# Patient Record
Sex: FEMALE | Race: WHITE | NOT HISPANIC OR LATINO | Employment: FULL TIME | URBAN - NONMETROPOLITAN AREA
[De-identification: names, ages, dates, MRNs, and addresses within clinical notes are randomized per-mention and may not be internally consistent; named-entity substitution may affect disease eponyms.]

---

## 2017-03-15 ENCOUNTER — OFFICE VISIT (OUTPATIENT)
Dept: OBSTETRICS AND GYNECOLOGY | Facility: CLINIC | Age: 57
End: 2017-03-15

## 2017-03-15 VITALS
BODY MASS INDEX: 30.04 KG/M2 | SYSTOLIC BLOOD PRESSURE: 126 MMHG | RESPIRATION RATE: 16 BRPM | HEIGHT: 71 IN | HEART RATE: 83 BPM | WEIGHT: 214.6 LBS | DIASTOLIC BLOOD PRESSURE: 76 MMHG

## 2017-03-15 DIAGNOSIS — Z01.419 ENCOUNTER FOR GYNECOLOGICAL EXAMINATION WITH PAPANICOLAOU SMEAR OF CERVIX: Primary | ICD-10-CM

## 2017-03-15 PROCEDURE — 87624 HPV HI-RISK TYP POOLED RSLT: CPT | Performed by: NURSE PRACTITIONER

## 2017-03-15 PROCEDURE — 88142 CYTOPATH C/V THIN LAYER: CPT | Performed by: PATHOLOGY

## 2017-03-15 PROCEDURE — 99396 PREV VISIT EST AGE 40-64: CPT | Performed by: NURSE PRACTITIONER

## 2017-03-15 RX ORDER — GABAPENTIN 300 MG/1
CAPSULE ORAL
COMMUNITY
Start: 2017-02-17 | End: 2019-10-09 | Stop reason: DRUGHIGH

## 2017-03-15 RX ORDER — POTASSIUM CHLORIDE 750 MG/1
TABLET, EXTENDED RELEASE ORAL
COMMUNITY
Start: 2017-03-09 | End: 2019-10-09

## 2017-03-15 RX ORDER — VENLAFAXINE HYDROCHLORIDE 150 MG/1
TABLET, EXTENDED RELEASE ORAL
COMMUNITY
Start: 2017-03-09

## 2017-03-15 RX ORDER — ERGOCALCIFEROL 1.25 MG/1
CAPSULE ORAL
COMMUNITY
Start: 2017-03-09

## 2017-03-15 RX ORDER — INTERFERON BETA-1B 0.3 MG
KIT SUBCUTANEOUS
COMMUNITY
Start: 2017-03-09

## 2017-03-15 RX ORDER — LISINOPRIL AND HYDROCHLOROTHIAZIDE 12.5; 1 MG/1; MG/1
TABLET ORAL
COMMUNITY
Start: 2017-03-09

## 2017-03-15 NOTE — PROGRESS NOTES
"Subjective   Chief Complaint   Patient presents with   • Gynecologic Exam     well woman annual visit     Maria C Mcarthur is a 56 y.o. year old  presenting to be seen for her annual exam.  She has no complaints.    She is not sexually active.  In the past 12 months there has not been new sexual partners.      She exercises regularly: no.  She wears her seat belt:yes.  She has concerns about domestic violence: no.  She is taking Vit D and Calcium:yes  Last colonoscopy:   Last DEXA: Never, will start at 59yo  Last MMG: 10/2016, normal; hx of L breast biopsy in  benign findings  Last PAP: 3/28/2013  Hx of abnormal pap: No      NATURAL MENOPAUSE  LMP: 8 years ago    OB History      Para Term  AB TAB SAB Ectopic Multiple Living    2 1   1  1             No Additional Complaints Reported    The following portions of the patient's history were reviewed and updated as appropriate:problem list, current medications, allergies, past family history, past medical history, past social history and past surgical history.    Smoking status: Never Smoker                                                              Smokeless status: Never Used                        Review of Systems   Constitutional: Negative.    Respiratory: Negative.    Cardiovascular: Negative.    Gastrointestinal: Negative.  Negative for constipation and diarrhea.   Endocrine: Negative.    Genitourinary: Negative.  Negative for vaginal bleeding and vaginal pain.   Musculoskeletal:        Pt has MS   Skin: Negative.    Neurological: Negative for dizziness, syncope, light-headedness and headaches.   Psychiatric/Behavioral: Negative for suicidal ideas. The patient is not nervous/anxious.          Objective   Visit Vitals   • /76 (BP Location: Right arm, Patient Position: Sitting, Cuff Size: Adult)   • Pulse 83   • Resp 16   • Ht 70.5\" (179.1 cm)   • Wt 214 lb 9.6 oz (97.3 kg)   • LMP  (Approximate)  Comment: 8 years ago   • " Breastfeeding No   • BMI 30.36 kg/m2       General:  well developed; well nourished  no acute distress   Skin:  No suspicious lesions seen   Cardiac: Heart sounds are normal.  Regular rate and rhythm without murmur, gallop or rub.   Resp:  Normal expansion.  Clear to auscultation.  No rales, rhonchi, or wheezing.   Thyroid: not examined   Breasts:  Examined in supine position  Symmetric without masses or skin dimpling  Nipples normal without inversion, lesions or discharge  There are no palpable axillary nodes   Abdomen: soft, non-tender; no masses  no umbilical or inginual hernias are present  no hepato-splenomegaly  Normal findings: bowel sounds normal   Psych: alert,oriented, in NAD with a full range of affect, normal behavior and no psychotic features   Pelvis: Clinical staff was present for exam  External genitalia:  normal appearance of the external genitalia including Bartholin's and [place]'s glands.  :  urethral meatus normal; urethral hypermobility is absent.  Vaginal:  atrophic mucosal changes are present;  Cervix:  normal appearance.  Uterus:  normal size, shape and consistency.  Adnexa:  non palpable bilaterally.  Rectal:  anus visually normal appearing. recto-vaginal exam unremarkable and confirms findings; guaiac negative;     Lab Review   CBC, CMP and Vit D, B12, A1C, UA    Imaging  Mammogram report       Assessment   1. Encounter for GYN exam with pap smear of cervix     Plan   1. Pap results: I will send card in mail or call if abnormal. RTC every 2 years for well woman exams. Recommend DEXA at 59yo and annual MMG.            This note was electronically signed.    Stephanie Lemus, APRN  March 15, 2017

## 2017-03-20 LAB
LAB AP CASE REPORT: NORMAL
LAB AP GYN ADDITIONAL INFORMATION: NORMAL
LAB AP GYN OTHER FINDINGS: NORMAL
Lab: NORMAL
PATH INTERP SPEC-IMP: NORMAL
STAT OF ADQ CVX/VAG CYTO-IMP: NORMAL

## 2017-03-23 LAB — HPV I/H RISK 4 DNA CVX QL PROBE+SIG AMP: NEGATIVE

## 2017-09-12 ENCOUNTER — TRANSCRIBE ORDERS (OUTPATIENT)
Dept: GENERAL RADIOLOGY | Facility: CLINIC | Age: 57
End: 2017-09-12

## 2017-09-12 DIAGNOSIS — Z12.31 ENCOUNTER FOR SCREENING MAMMOGRAM FOR MALIGNANT NEOPLASM OF BREAST: Primary | ICD-10-CM

## 2018-02-27 PROBLEM — M81.0 AGE-RELATED OSTEOPOROSIS WITHOUT CURRENT PATHOLOGICAL FRACTURE: Status: ACTIVE | Noted: 2018-02-27

## 2018-02-27 RX ORDER — SODIUM CHLORIDE 9 MG/ML
250 INJECTION, SOLUTION INTRAVENOUS ONCE
Status: CANCELLED | OUTPATIENT
Start: 2018-02-27

## 2018-03-16 ENCOUNTER — INFUSION (OUTPATIENT)
Dept: ONCOLOGY | Facility: HOSPITAL | Age: 58
End: 2018-03-16

## 2018-03-16 VITALS
DIASTOLIC BLOOD PRESSURE: 62 MMHG | TEMPERATURE: 96.8 F | RESPIRATION RATE: 16 BRPM | SYSTOLIC BLOOD PRESSURE: 132 MMHG | HEART RATE: 70 BPM

## 2018-03-16 DIAGNOSIS — M81.0 AGE-RELATED OSTEOPOROSIS WITHOUT CURRENT PATHOLOGICAL FRACTURE: Primary | ICD-10-CM

## 2018-03-16 PROCEDURE — 25010000002 ZOLEDRONIC ACID 5 MG/100ML SOLUTION: Performed by: NURSE PRACTITIONER

## 2018-03-16 PROCEDURE — 96374 THER/PROPH/DIAG INJ IV PUSH: CPT | Performed by: NURSE PRACTITIONER

## 2018-03-16 RX ORDER — ZOLEDRONIC ACID 5 MG/100ML
5 INJECTION, SOLUTION INTRAVENOUS ONCE
Status: CANCELLED | OUTPATIENT
Start: 2018-03-16

## 2018-03-16 RX ORDER — SODIUM CHLORIDE 9 MG/ML
250 INJECTION, SOLUTION INTRAVENOUS ONCE
Status: CANCELLED | OUTPATIENT
Start: 2018-03-16

## 2018-03-16 RX ORDER — ZOLEDRONIC ACID 5 MG/100ML
5 INJECTION, SOLUTION INTRAVENOUS ONCE
Status: COMPLETED | OUTPATIENT
Start: 2018-03-16 | End: 2018-03-16

## 2018-03-16 RX ORDER — SODIUM CHLORIDE 9 MG/ML
250 INJECTION, SOLUTION INTRAVENOUS ONCE
Status: COMPLETED | OUTPATIENT
Start: 2018-03-16 | End: 2018-03-16

## 2018-03-16 RX ADMIN — ZOLEDRONIC ACID 5 MG: 5 INJECTION, SOLUTION INTRAVENOUS at 10:49

## 2018-03-16 RX ADMIN — SODIUM CHLORIDE 250 ML: 9 INJECTION, SOLUTION INTRAVENOUS at 10:49

## 2019-03-15 RX ORDER — SODIUM CHLORIDE 9 MG/ML
250 INJECTION, SOLUTION INTRAVENOUS ONCE
Status: CANCELLED | OUTPATIENT
Start: 2019-03-15

## 2019-03-18 ENCOUNTER — APPOINTMENT (OUTPATIENT)
Dept: ONCOLOGY | Facility: HOSPITAL | Age: 59
End: 2019-03-18

## 2019-03-25 ENCOUNTER — INFUSION (OUTPATIENT)
Dept: ONCOLOGY | Facility: HOSPITAL | Age: 59
End: 2019-03-25

## 2019-03-25 VITALS
RESPIRATION RATE: 18 BRPM | HEART RATE: 50 BPM | SYSTOLIC BLOOD PRESSURE: 135 MMHG | TEMPERATURE: 98 F | DIASTOLIC BLOOD PRESSURE: 67 MMHG

## 2019-03-25 DIAGNOSIS — M81.0 AGE-RELATED OSTEOPOROSIS WITHOUT CURRENT PATHOLOGICAL FRACTURE: Primary | ICD-10-CM

## 2019-03-25 LAB
CALCIUM SPEC-SCNC: 9.4 MG/DL (ref 8.6–10.5)
CREAT BLD-MCNC: 0.74 MG/DL (ref 0.57–1)
GFR SERPL CREATININE-BSD FRML MDRD: 80 ML/MIN/1.73

## 2019-03-25 PROCEDURE — 25010000002 ZOLEDRONIC ACID 5 MG/100ML SOLUTION: Performed by: NURSE PRACTITIONER

## 2019-03-25 PROCEDURE — 96365 THER/PROPH/DIAG IV INF INIT: CPT | Performed by: NURSE PRACTITIONER

## 2019-03-25 PROCEDURE — 82565 ASSAY OF CREATININE: CPT | Performed by: NURSE PRACTITIONER

## 2019-03-25 PROCEDURE — 82310 ASSAY OF CALCIUM: CPT | Performed by: NURSE PRACTITIONER

## 2019-03-25 RX ORDER — SODIUM CHLORIDE 9 MG/ML
250 INJECTION, SOLUTION INTRAVENOUS ONCE
Status: COMPLETED | OUTPATIENT
Start: 2019-03-25 | End: 2019-03-25

## 2019-03-25 RX ORDER — ZOLEDRONIC ACID 5 MG/100ML
5 INJECTION, SOLUTION INTRAVENOUS ONCE
Status: COMPLETED | OUTPATIENT
Start: 2019-03-25 | End: 2019-03-25

## 2019-03-25 RX ORDER — SODIUM CHLORIDE 9 MG/ML
250 INJECTION, SOLUTION INTRAVENOUS ONCE
Status: CANCELLED | OUTPATIENT
Start: 2019-03-25

## 2019-03-25 RX ORDER — ZOLEDRONIC ACID 5 MG/100ML
5 INJECTION, SOLUTION INTRAVENOUS ONCE
Status: CANCELLED | OUTPATIENT
Start: 2019-03-25

## 2019-03-25 RX ADMIN — ZOLEDRONIC ACID 5 MG: 5 INJECTION, SOLUTION INTRAVENOUS at 14:44

## 2019-03-25 RX ADMIN — SODIUM CHLORIDE 250 ML: 9 INJECTION, SOLUTION INTRAVENOUS at 14:43

## 2019-07-18 ENCOUNTER — TRANSCRIBE ORDERS (OUTPATIENT)
Dept: GENERAL RADIOLOGY | Facility: CLINIC | Age: 59
End: 2019-07-18

## 2019-07-18 DIAGNOSIS — Z12.31 ENCOUNTER FOR SCREENING MAMMOGRAM FOR MALIGNANT NEOPLASM OF BREAST: Primary | ICD-10-CM

## 2019-10-09 ENCOUNTER — OFFICE VISIT (OUTPATIENT)
Dept: OBSTETRICS AND GYNECOLOGY | Facility: CLINIC | Age: 59
End: 2019-10-09

## 2019-10-09 VITALS
DIASTOLIC BLOOD PRESSURE: 80 MMHG | SYSTOLIC BLOOD PRESSURE: 120 MMHG | BODY MASS INDEX: 30.75 KG/M2 | HEART RATE: 94 BPM | WEIGHT: 214.8 LBS | HEIGHT: 70 IN

## 2019-10-09 DIAGNOSIS — Z01.419 ENCOUNTER FOR GYNECOLOGICAL EXAMINATION WITH PAPANICOLAOU SMEAR OF CERVIX: Primary | ICD-10-CM

## 2019-10-09 PROCEDURE — 87624 HPV HI-RISK TYP POOLED RSLT: CPT | Performed by: NURSE PRACTITIONER

## 2019-10-09 PROCEDURE — G0123 SCREEN CERV/VAG THIN LAYER: HCPCS | Performed by: NURSE PRACTITIONER

## 2019-10-09 PROCEDURE — 99396 PREV VISIT EST AGE 40-64: CPT | Performed by: NURSE PRACTITIONER

## 2019-10-09 PROCEDURE — 88141 CYTOPATH C/V INTERPRET: CPT | Performed by: PATHOLOGY

## 2019-10-09 RX ORDER — GABAPENTIN 800 MG/1
TABLET ORAL
Refills: 5 | COMMUNITY
Start: 2019-09-25

## 2019-10-09 NOTE — PROGRESS NOTES
"Subjective   Chief Complaint   Patient presents with   • Gynecologic Exam     well woman annual visit     Maria C Mcarthur is a 59 y.o. year old  presenting to be seen for her annual exam.  She has no complaints.    She is not sexually active.  In the past 12 months there has not been new sexual partners.      She exercises regularly: no.  She wears her seat belt:yes.  She has concerns about domestic violence: no.  She is taking Vit D and Calcium:yes  Last colonoscopy: 2017, has every 5 years because father  of colon cancer  Last DEXA: , osteoporosis, on Reclast infusions annually  Last MM19; hx of L breast biopsy in  benign findings  Last PAP: 3/15/17, HPV negative  Hx of abnormal pap: No      NATURAL MENOPAUSE  LMP: 10 years ago    OB History      Para Term  AB Living    2 1 1   1      SAB TAB Ectopic Molar Multiple Live Births    1                    No Additional Complaints Reported    The following portions of the patient's history were reviewed and updated as appropriate:problem list, current medications, allergies, past family history, past medical history, past social history and past surgical history.    Social History    Tobacco Use      Smoking status: Never Smoker      Smokeless tobacco: Never Used    Review of Systems   Constitutional: Negative.  Negative for chills and fever.   Respiratory: Negative.    Cardiovascular: Negative.  Negative for chest pain and palpitations.        Irregular heartbeat, scheduled with cardio on the    Gastrointestinal: Negative.  Negative for constipation and diarrhea.   Endocrine: Negative.    Genitourinary: Negative.  Negative for vaginal bleeding and vaginal pain.   Musculoskeletal:        Pt has MS   Skin: Negative.    Neurological: Negative for dizziness, syncope, light-headedness and headaches.   Psychiatric/Behavioral: Negative for suicidal ideas. The patient is not nervous/anxious.          Objective   Ht 177.8 cm (70\") "   Wt 97.4 kg (214 lb 12.8 oz)   LMP  (LMP Unknown) Comment: periods stopped around 10 years ago  Breastfeeding? No   BMI 30.82 kg/m²     General:  well developed; well nourished  no acute distress   Skin:  No suspicious lesions seen   Cardiac: Heart sounds are normal.  Regular rate and irregular rhythm without murmur, gallop or rub.   Resp:  Normal expansion.  Clear to auscultation.  No rales, rhonchi, or wheezing.   Thyroid: not examined   Breasts:  Examined in supine position  Symmetric without masses or skin dimpling  Nipples normal without inversion, lesions or discharge  There are no palpable axillary nodes   Abdomen: soft, non-tender; no masses  no umbilical or inginual hernias are present  no hepato-splenomegaly  Normal findings: bowel sounds normal   Psych: alert,oriented, in NAD with a full range of affect, normal behavior and no psychotic features   Pelvis: Clinical staff was present for exam  External genitalia:  normal appearance of the external genitalia including Bartholin's and [place]'s glands.  :  urethral meatus normal; urethral hypermobility is absent.  Vaginal:  atrophic mucosal changes are present;  Cervix:  normal appearance.  Uterus:  normal size, shape and consistency.  Adnexa:  non palpable bilaterally.  Rectal:  anus visually normal appearing. recto-vaginal exam unremarkable and confirms findings;     Lab Review   CBC, CMP and TSH    Imaging  Mammogram report       Maria C was seen today for gynecologic exam.    Diagnoses and all orders for this visit:    Encounter for gynecological examination with Papanicolaou smear of cervix  -     Liquid-based Pap Smear, Screening    Pap results: I will send card in mail or call if abnormal. RTC annually for well woman exams. MMG completed already through PCP order. Continue Infusions for osteoporosis.     Denies any concerns or questions today.        This note was electronically signed.    Stephanie Lemus, APRN  October 9, 2019

## 2019-10-14 LAB
GEN CATEG CVX/VAG CYTO-IMP: ABNORMAL
LAB AP CASE REPORT: ABNORMAL
LAB AP GYN ADDITIONAL INFORMATION: ABNORMAL
PATH INTERP SPEC-IMP: ABNORMAL
STAT OF ADQ CVX/VAG CYTO-IMP: ABNORMAL

## 2019-10-18 LAB — HPV I/H RISK 4 DNA CVX QL PROBE+SIG AMP: NEGATIVE

## 2020-01-06 ENCOUNTER — TRANSCRIBE ORDERS (OUTPATIENT)
Dept: GENERAL RADIOLOGY | Facility: CLINIC | Age: 60
End: 2020-01-06

## 2020-01-06 DIAGNOSIS — M81.8 OTHER OSTEOPOROSIS WITHOUT CURRENT PATHOLOGICAL FRACTURE: Primary | ICD-10-CM

## 2020-03-25 ENCOUNTER — APPOINTMENT (OUTPATIENT)
Dept: ONCOLOGY | Facility: HOSPITAL | Age: 60
End: 2020-03-25

## 2020-03-25 ENCOUNTER — APPOINTMENT (OUTPATIENT)
Dept: INFUSION THERAPY | Facility: HOSPITAL | Age: 60
End: 2020-03-25

## 2020-07-08 ENCOUNTER — TRANSCRIBE ORDERS (OUTPATIENT)
Dept: MAMMOGRAPHY | Facility: CLINIC | Age: 60
End: 2020-07-08

## 2020-07-08 DIAGNOSIS — Z12.31 ENCOUNTER FOR SCREENING MAMMOGRAM FOR MALIGNANT NEOPLASM OF BREAST: Primary | ICD-10-CM

## 2021-06-09 ENCOUNTER — TRANSCRIBE ORDERS (OUTPATIENT)
Dept: GENERAL RADIOLOGY | Facility: CLINIC | Age: 61
End: 2021-06-09

## 2021-06-09 DIAGNOSIS — Z12.31 ENCOUNTER FOR SCREENING MAMMOGRAM FOR BREAST CANCER: Primary | ICD-10-CM

## 2021-06-10 PROBLEM — M81.8 OTHER OSTEOPOROSIS WITHOUT CURRENT PATHOLOGICAL FRACTURE: Status: ACTIVE | Noted: 2021-06-10

## 2021-06-10 RX ORDER — SODIUM CHLORIDE 9 MG/ML
250 INJECTION, SOLUTION INTRAVENOUS ONCE
Status: CANCELLED | OUTPATIENT
Start: 2021-06-10

## 2021-06-16 ENCOUNTER — OFFICE VISIT (OUTPATIENT)
Dept: OBSTETRICS AND GYNECOLOGY | Facility: CLINIC | Age: 61
End: 2021-06-16

## 2021-06-16 ENCOUNTER — LAB (OUTPATIENT)
Dept: LAB | Facility: OTHER | Age: 61
End: 2021-06-16

## 2021-06-16 VITALS
HEART RATE: 93 BPM | SYSTOLIC BLOOD PRESSURE: 118 MMHG | DIASTOLIC BLOOD PRESSURE: 72 MMHG | BODY MASS INDEX: 32.44 KG/M2 | WEIGHT: 226.6 LBS | HEIGHT: 70 IN

## 2021-06-16 DIAGNOSIS — Z01.419 ENCOUNTER FOR GYNECOLOGICAL EXAMINATION WITH PAPANICOLAOU SMEAR OF CERVIX: Primary | ICD-10-CM

## 2021-06-16 PROCEDURE — 99396 PREV VISIT EST AGE 40-64: CPT | Performed by: NURSE PRACTITIONER

## 2021-06-16 RX ORDER — ASPIRIN 81 MG/1
81 TABLET ORAL DAILY
COMMUNITY

## 2021-06-16 NOTE — PROGRESS NOTES
Subjective   Chief Complaint   Patient presents with   • Gynecologic Exam     WWE     Maria C Mcarthur is a 61 y.o. year old  presenting to be seen for her annual exam.  She has no complaints.    She is not sexually active.  In the past 12 months there has not been new sexual partners.      She exercises regularly: no.  She wears her seat belt:yes.  She has concerns about domestic violence: no.  She is taking Vit D and Calcium:yes  Last colonoscopy: , has every 5 years because father  of colon cancer  Last DEXA: 2020, osteoporosis, on Reclast infusions annually  Last MM20 screening, 20 diagnostic left; hx of L breast biopsy in  benign findings, sister  at 47yo of breast cancer, already scheduled 19  Last PAP: 10/9/2019, ASCUS HPV negative  Hx of abnormal pap: No      NATURAL MENOPAUSE  LMP: 12-13 years ago    OB History        2    Para   1    Term   1            AB   1    Living   1       SAB   1    TAB        Ectopic        Molar        Multiple        Live Births                    No Additional Complaints Reported    The following portions of the patient's history were reviewed and updated as appropriate:problem list, current medications, allergies, past family history, past medical history, past social history and past surgical history.    Social History    Tobacco Use      Smoking status: Never Smoker      Smokeless tobacco: Never Used    Review of Systems   Constitutional: Negative.  Negative for chills and fever.   Respiratory: Negative.    Cardiovascular: Negative.  Negative for chest pain and palpitations.   Gastrointestinal: Negative.  Negative for constipation and diarrhea.   Endocrine: Negative.    Genitourinary: Negative.  Negative for vaginal bleeding and vaginal pain.   Musculoskeletal:        Pt has MS   Skin: Negative.    Neurological: Negative for dizziness, syncope, light-headedness and headaches.   Psychiatric/Behavioral: Negative for  "suicidal ideas. The patient is not nervous/anxious.          Objective   /72   Pulse 93   Ht 177.8 cm (70\")   Wt 103 kg (226 lb 9.6 oz)   LMP  (LMP Unknown) Comment: periods stopped around age 48  Breastfeeding No   BMI 32.51 kg/m²     General:  well developed; well nourished  no acute distress   Skin:  No suspicious lesions seen   Cardiac: Heart sounds are normal.  Regular rate and irregular rhythm without murmur, gallop or rub.   Resp:  Normal expansion.  Clear to auscultation.  No rales, rhonchi, or wheezing.   Thyroid: not examined   Breasts:  Examined in supine position  Symmetric without masses or skin dimpling  Nipples normal without inversion, lesions or discharge  There are no palpable axillary nodes   Abdomen: soft, non-tender; no masses  no umbilical or inginual hernias are present  no hepato-splenomegaly  Normal findings: bowel sounds normal   Psych: alert,oriented, in NAD with a full range of affect, normal behavior and no psychotic features   Pelvis: Clinical staff was present for exam  External genitalia:  normal appearance of the external genitalia including Bartholin's and [place]'s glands.  :  urethral meatus normal; urethral hypermobility is absent.  Vaginal:  atrophic mucosal changes are present; mild  Cervix:  normal appearance.  Uterus:  normal size, shape and consistency.  Adnexa:  non palpable bilaterally.  Rectal:  anus visually normal appearing. recto-vaginal exam unremarkable and confirms findings;     Lab Review   CBC, CMP, TSH and UA    Imaging  CT of abdomen/pelvis report  DEXA  Mammogram report       Diagnoses and all orders for this visit:    1. Encounter for gynecological examination with Papanicolaou smear of cervix (Primary)  -     Liquid-based Pap Smear, Screening       Pap results: I will send card in mail or call if abnormal. RTC annually for well woman exams.     Recommended annually MMG. MMG already ordered through PCP, scheduled 7/23/21. DEXA is up to date. " Continue Infusions for osteoporosis annually. Colonoscopy due next year q 5 years due to family hx.     She is up to date with routine visit and labs with PCP.     She has completed COVID vaccination.      Denies any concerns or questions today.      This note was electronically signed.    Stephanie Lemus, APRN  June 16, 2021

## 2021-06-21 LAB
LAB AP CASE REPORT: NORMAL
PATH INTERP SPEC-IMP: NORMAL

## 2021-06-22 ENCOUNTER — APPOINTMENT (OUTPATIENT)
Dept: ONCOLOGY | Facility: HOSPITAL | Age: 61
End: 2021-06-22

## 2021-06-25 DIAGNOSIS — R87.619 ENDOMETRIAL CELLS ON CERVICAL PAP SMEAR INCONSISTENT W/LMP: Primary | ICD-10-CM

## 2021-06-25 DIAGNOSIS — Z78.0 POSTMENOPAUSAL: ICD-10-CM

## 2021-06-25 NOTE — PROGRESS NOTES
Endometrial cells on pap smear. Will obtain TVUS. If endometrium >5mm, I will perform EMB. If <5mm, we will monitor. Pt denies any vaginal bleeding or concerns. No family hx of uterine cancer.

## 2021-06-28 ENCOUNTER — INFUSION (OUTPATIENT)
Dept: ONCOLOGY | Facility: HOSPITAL | Age: 61
End: 2021-06-28

## 2021-06-28 VITALS
SYSTOLIC BLOOD PRESSURE: 140 MMHG | DIASTOLIC BLOOD PRESSURE: 64 MMHG | TEMPERATURE: 97.8 F | HEART RATE: 87 BPM | RESPIRATION RATE: 18 BRPM

## 2021-06-28 DIAGNOSIS — M81.8 OTHER OSTEOPOROSIS WITHOUT CURRENT PATHOLOGICAL FRACTURE: ICD-10-CM

## 2021-06-28 DIAGNOSIS — M81.0 AGE-RELATED OSTEOPOROSIS WITHOUT CURRENT PATHOLOGICAL FRACTURE: Primary | ICD-10-CM

## 2021-06-28 LAB
ALBUMIN SERPL-MCNC: 4 G/DL (ref 3.5–5.2)
ALBUMIN/GLOB SERPL: 1.3 G/DL
ALP SERPL-CCNC: 94 U/L (ref 39–117)
ALT SERPL W P-5'-P-CCNC: 42 U/L (ref 1–33)
ANION GAP SERPL CALCULATED.3IONS-SCNC: 9 MMOL/L (ref 5–15)
AST SERPL-CCNC: 37 U/L (ref 1–32)
BASOPHILS # BLD AUTO: 0.02 10*3/MM3 (ref 0–0.2)
BASOPHILS NFR BLD AUTO: 0.2 % (ref 0–1.5)
BILIRUB SERPL-MCNC: 0.5 MG/DL (ref 0–1.2)
BUN SERPL-MCNC: 14 MG/DL (ref 8–23)
BUN/CREAT SERPL: 19.7 (ref 7–25)
CALCIUM SPEC-SCNC: 9.2 MG/DL (ref 8.6–10.5)
CHLORIDE SERPL-SCNC: 103 MMOL/L (ref 98–107)
CO2 SERPL-SCNC: 28 MMOL/L (ref 22–29)
CREAT SERPL-MCNC: 0.71 MG/DL (ref 0.57–1)
DEPRECATED RDW RBC AUTO: 43.5 FL (ref 37–54)
EOSINOPHIL # BLD AUTO: 0.02 10*3/MM3 (ref 0–0.4)
EOSINOPHIL NFR BLD AUTO: 0.2 % (ref 0.3–6.2)
ERYTHROCYTE [DISTWIDTH] IN BLOOD BY AUTOMATED COUNT: 13.8 % (ref 12.3–15.4)
GFR SERPL CREATININE-BSD FRML MDRD: 83 ML/MIN/1.73
GLOBULIN UR ELPH-MCNC: 3.1 GM/DL
GLUCOSE SERPL-MCNC: 104 MG/DL (ref 65–99)
HCT VFR BLD AUTO: 42.1 % (ref 34–46.6)
HGB BLD-MCNC: 14.6 G/DL (ref 12–15.9)
HOLD SPECIMEN: NORMAL
IMM GRANULOCYTES # BLD AUTO: 0.09 10*3/MM3 (ref 0–0.05)
IMM GRANULOCYTES NFR BLD AUTO: 1 % (ref 0–0.5)
LYMPHOCYTES # BLD AUTO: 2.19 10*3/MM3 (ref 0.7–3.1)
LYMPHOCYTES NFR BLD AUTO: 24.2 % (ref 19.6–45.3)
MAGNESIUM SERPL-MCNC: 2 MG/DL (ref 1.6–2.4)
MCH RBC QN AUTO: 30.2 PG (ref 26.6–33)
MCHC RBC AUTO-ENTMCNC: 34.7 G/DL (ref 31.5–35.7)
MCV RBC AUTO: 87.2 FL (ref 79–97)
MONOCYTES # BLD AUTO: 0.84 10*3/MM3 (ref 0.1–0.9)
MONOCYTES NFR BLD AUTO: 9.3 % (ref 5–12)
NEUTROPHILS NFR BLD AUTO: 5.88 10*3/MM3 (ref 1.7–7)
NEUTROPHILS NFR BLD AUTO: 65.1 % (ref 42.7–76)
NRBC BLD AUTO-RTO: 0 /100 WBC (ref 0–0.2)
PHOSPHATE SERPL-MCNC: 3.4 MG/DL (ref 2.5–4.5)
PLATELET # BLD AUTO: 301 10*3/MM3 (ref 140–450)
PMV BLD AUTO: 10.1 FL (ref 6–12)
POTASSIUM SERPL-SCNC: 3.8 MMOL/L (ref 3.5–5.2)
PROT SERPL-MCNC: 7.1 G/DL (ref 6–8.5)
RBC # BLD AUTO: 4.83 10*6/MM3 (ref 3.77–5.28)
SODIUM SERPL-SCNC: 140 MMOL/L (ref 136–145)
WBC # BLD AUTO: 9.04 10*3/MM3 (ref 3.4–10.8)

## 2021-06-28 PROCEDURE — 80053 COMPREHEN METABOLIC PANEL: CPT

## 2021-06-28 PROCEDURE — 25010000002 ZOLEDRONIC ACID 5 MG/100ML SOLUTION: Performed by: NURSE PRACTITIONER

## 2021-06-28 PROCEDURE — 96365 THER/PROPH/DIAG IV INF INIT: CPT | Performed by: NURSE PRACTITIONER

## 2021-06-28 PROCEDURE — 83735 ASSAY OF MAGNESIUM: CPT

## 2021-06-28 PROCEDURE — 85025 COMPLETE CBC W/AUTO DIFF WBC: CPT

## 2021-06-28 PROCEDURE — 36415 COLL VENOUS BLD VENIPUNCTURE: CPT

## 2021-06-28 PROCEDURE — 84100 ASSAY OF PHOSPHORUS: CPT

## 2021-06-28 RX ORDER — ZOLEDRONIC ACID 5 MG/100ML
5 INJECTION, SOLUTION INTRAVENOUS ONCE
Status: COMPLETED | OUTPATIENT
Start: 2021-06-28 | End: 2021-06-28

## 2021-06-28 RX ORDER — SODIUM CHLORIDE 9 MG/ML
250 INJECTION, SOLUTION INTRAVENOUS ONCE
Status: CANCELLED | OUTPATIENT
Start: 2022-06-28

## 2021-06-28 RX ORDER — ZOLEDRONIC ACID 5 MG/100ML
5 INJECTION, SOLUTION INTRAVENOUS ONCE
Status: CANCELLED | OUTPATIENT
Start: 2022-06-28

## 2021-06-28 RX ORDER — SODIUM CHLORIDE 9 MG/ML
250 INJECTION, SOLUTION INTRAVENOUS ONCE
Status: COMPLETED | OUTPATIENT
Start: 2021-06-28 | End: 2021-06-28

## 2021-06-28 RX ADMIN — ZOLEDRONIC ACID 5 MG: 5 INJECTION, SOLUTION INTRAVENOUS at 14:54

## 2021-06-28 RX ADMIN — SODIUM CHLORIDE 250 ML: 9 INJECTION, SOLUTION INTRAVENOUS at 14:45

## 2021-07-14 ENCOUNTER — LAB (OUTPATIENT)
Dept: LAB | Facility: OTHER | Age: 61
End: 2021-07-14

## 2021-07-14 ENCOUNTER — PROCEDURE VISIT (OUTPATIENT)
Dept: OBSTETRICS AND GYNECOLOGY | Facility: CLINIC | Age: 61
End: 2021-07-14

## 2021-07-14 VITALS
DIASTOLIC BLOOD PRESSURE: 76 MMHG | WEIGHT: 229.8 LBS | HEART RATE: 98 BPM | SYSTOLIC BLOOD PRESSURE: 124 MMHG | BODY MASS INDEX: 32.9 KG/M2 | HEIGHT: 70 IN

## 2021-07-14 DIAGNOSIS — R87.618 UNEXPLAINED ENDOMETRIAL CELLS ON CERVICAL CYTOLOGY: Primary | ICD-10-CM

## 2021-07-14 DIAGNOSIS — R93.89 ABNORMAL PELVIC ULTRASOUND: ICD-10-CM

## 2021-07-14 PROCEDURE — 58100 BIOPSY OF UTERUS LINING: CPT | Performed by: NURSE PRACTITIONER

## 2021-07-14 RX ORDER — METOPROLOL SUCCINATE 25 MG/1
12.5 TABLET, EXTENDED RELEASE ORAL DAILY
COMMUNITY
Start: 2021-06-21

## 2021-07-14 NOTE — PROGRESS NOTES
Endometrial Biopsy    Date of procedure:  7/14/2021    Endometrial cells on pap. TVUS shows a 8mm endometrium with a cystic lesion. EMB indicated. Written consent provided by pt.     Procedure documentation:    The cervix was grasped anterior at the 2 o'clock and 10 o'clock position.  The cavity sounded to 6 centimeters.  An endometrial biopsy specimen was obtained and multiple passes.  The tissue was sent for permanent histopathologic evaluation.  Tenaculum was removed from the cervix with scant bleeding.    Post procedure instructions: She was instructed to call us in 1 week's time if she has not heard from us otherwise.  If there is any significant fever or excessive bleeding or pain she is to call immediately.    Diagnosis: Unexplained endometrial cells on cervical cytology, abnormal pelvic ultrasound    This note was electronically signed.    Stephanie Lemus, APRN  7/14/2021

## 2021-07-16 LAB
LAB AP CASE REPORT: NORMAL
LAB AP CLINICAL INFORMATION: NORMAL
PATH REPORT.FINAL DX SPEC: NORMAL

## 2021-08-05 ENCOUNTER — TRANSCRIBE ORDERS (OUTPATIENT)
Dept: GENERAL RADIOLOGY | Facility: CLINIC | Age: 61
End: 2021-08-05

## 2021-08-05 DIAGNOSIS — R92.2 INCONCLUSIVE MAMMOGRAM: Primary | ICD-10-CM

## 2021-12-06 ENCOUNTER — TRANSCRIBE ORDERS (OUTPATIENT)
Dept: BONE DENSITY | Facility: CLINIC | Age: 61
End: 2021-12-06

## 2021-12-06 DIAGNOSIS — M89.9 DISORDER OF BONE: Primary | ICD-10-CM

## 2021-12-22 ENCOUNTER — LAB (OUTPATIENT)
Dept: LAB | Facility: OTHER | Age: 61
End: 2021-12-22

## 2021-12-22 PROCEDURE — 87635 SARS-COV-2 COVID-19 AMP PRB: CPT | Performed by: NURSE PRACTITIONER

## 2022-05-20 DIAGNOSIS — M81.0 AGE-RELATED OSTEOPOROSIS WITHOUT CURRENT PATHOLOGICAL FRACTURE: ICD-10-CM

## 2022-05-20 DIAGNOSIS — M81.8 OTHER OSTEOPOROSIS WITHOUT CURRENT PATHOLOGICAL FRACTURE: Primary | ICD-10-CM

## 2022-05-20 RX ORDER — ZOLEDRONIC ACID 5 MG/100ML
5 INJECTION, SOLUTION INTRAVENOUS ONCE
Status: CANCELLED | OUTPATIENT
Start: 2022-05-20

## 2022-05-20 RX ORDER — SODIUM CHLORIDE 9 MG/ML
250 INJECTION, SOLUTION INTRAVENOUS ONCE
Status: CANCELLED | OUTPATIENT
Start: 2022-05-20

## 2022-06-21 ENCOUNTER — TRANSCRIBE ORDERS (OUTPATIENT)
Dept: GENERAL RADIOLOGY | Facility: CLINIC | Age: 62
End: 2022-06-21

## 2022-06-21 DIAGNOSIS — Z12.31 ENCOUNTER FOR SCREENING MAMMOGRAM FOR MALIGNANT NEOPLASM OF BREAST: Primary | ICD-10-CM

## 2022-06-24 ENCOUNTER — TELEPHONE (OUTPATIENT)
Dept: ONCOLOGY | Facility: CLINIC | Age: 62
End: 2022-06-24

## 2022-06-24 NOTE — TELEPHONE ENCOUNTER
Called patient and had to leave message regarding the lab work that is needed.  Asked patient to go to the [place] location to get the labs done.

## 2022-06-24 NOTE — TELEPHONE ENCOUNTER
----- Message from Isaiah Guzman RN sent at 6/24/2022  9:56 AM CDT -----  Regarding: labs  Please call pt and have her get labs done either today or Monday or Tuesday. Before her infusion appt here on Wednesday. Thank you.

## 2022-06-27 ENCOUNTER — LAB (OUTPATIENT)
Dept: LAB | Facility: OTHER | Age: 62
End: 2022-06-27

## 2022-06-27 DIAGNOSIS — M81.0 AGE-RELATED OSTEOPOROSIS WITHOUT CURRENT PATHOLOGICAL FRACTURE: ICD-10-CM

## 2022-06-27 DIAGNOSIS — M81.8 OTHER OSTEOPOROSIS WITHOUT CURRENT PATHOLOGICAL FRACTURE: ICD-10-CM

## 2022-06-27 LAB
ALBUMIN SERPL-MCNC: 4.1 G/DL (ref 3.5–5)
ALBUMIN/GLOB SERPL: 1.2 G/DL (ref 1.1–1.8)
ALP SERPL-CCNC: 82 U/L (ref 38–126)
ALT SERPL W P-5'-P-CCNC: 46 U/L
ANION GAP SERPL CALCULATED.3IONS-SCNC: 6 MMOL/L (ref 5–15)
AST SERPL-CCNC: 40 U/L (ref 14–36)
BASOPHILS # BLD AUTO: 0.01 10*3/MM3 (ref 0–0.2)
BASOPHILS NFR BLD AUTO: 0.2 % (ref 0–1.5)
BILIRUB SERPL-MCNC: 0.9 MG/DL (ref 0.2–1.3)
BUN SERPL-MCNC: 9 MG/DL (ref 7–23)
BUN/CREAT SERPL: 12.5 (ref 7–25)
CALCIUM SPEC-SCNC: 8.9 MG/DL (ref 8.4–10.2)
CHLORIDE SERPL-SCNC: 102 MMOL/L (ref 101–112)
CO2 SERPL-SCNC: 33 MMOL/L (ref 22–30)
CREAT SERPL-MCNC: 0.72 MG/DL (ref 0.52–1.04)
DEPRECATED RDW RBC AUTO: 47 FL (ref 37–54)
EGFRCR SERPLBLD CKD-EPI 2021: 93.5 ML/MIN/1.73
EOSINOPHIL # BLD AUTO: 0 10*3/MM3 (ref 0–0.4)
EOSINOPHIL NFR BLD AUTO: 0 % (ref 0.3–6.2)
ERYTHROCYTE [DISTWIDTH] IN BLOOD BY AUTOMATED COUNT: 14.7 % (ref 12.3–15.4)
GLOBULIN UR ELPH-MCNC: 3.4 GM/DL (ref 2.3–3.5)
GLUCOSE SERPL-MCNC: 129 MG/DL (ref 70–99)
HCT VFR BLD AUTO: 44.9 % (ref 34–46.6)
HGB BLD-MCNC: 14.9 G/DL (ref 12–15.9)
LYMPHOCYTES # BLD AUTO: 1.78 10*3/MM3 (ref 0.7–3.1)
LYMPHOCYTES NFR BLD AUTO: 27.6 % (ref 19.6–45.3)
MAGNESIUM SERPL-MCNC: 2.1 MG/DL (ref 1.6–2.3)
MCH RBC QN AUTO: 29.7 PG (ref 26.6–33)
MCHC RBC AUTO-ENTMCNC: 33.2 G/DL (ref 31.5–35.7)
MCV RBC AUTO: 89.4 FL (ref 79–97)
MONOCYTES # BLD AUTO: 0.77 10*3/MM3 (ref 0.1–0.9)
MONOCYTES NFR BLD AUTO: 11.9 % (ref 5–12)
NEUTROPHILS NFR BLD AUTO: 3.89 10*3/MM3 (ref 1.7–7)
NEUTROPHILS NFR BLD AUTO: 60.3 % (ref 42.7–76)
PHOSPHATE SERPL-MCNC: 3.4 MG/DL (ref 2.5–4.5)
PLATELET # BLD AUTO: 255 10*3/MM3 (ref 140–450)
PMV BLD AUTO: 10.1 FL (ref 6–12)
POTASSIUM SERPL-SCNC: 3.6 MMOL/L (ref 3.4–5)
PROT SERPL-MCNC: 7.5 G/DL (ref 6.3–8.6)
RBC # BLD AUTO: 5.02 10*6/MM3 (ref 3.77–5.28)
SODIUM SERPL-SCNC: 141 MMOL/L (ref 137–145)
WBC NRBC COR # BLD: 6.45 10*3/MM3 (ref 3.4–10.8)

## 2022-06-27 PROCEDURE — 84100 ASSAY OF PHOSPHORUS: CPT | Performed by: NURSE PRACTITIONER

## 2022-06-27 PROCEDURE — 80053 COMPREHEN METABOLIC PANEL: CPT | Performed by: NURSE PRACTITIONER

## 2022-06-27 PROCEDURE — 83735 ASSAY OF MAGNESIUM: CPT | Performed by: NURSE PRACTITIONER

## 2022-06-27 PROCEDURE — 85025 COMPLETE CBC W/AUTO DIFF WBC: CPT | Performed by: NURSE PRACTITIONER

## 2022-06-29 ENCOUNTER — INFUSION (OUTPATIENT)
Dept: ONCOLOGY | Facility: HOSPITAL | Age: 62
End: 2022-06-29

## 2022-06-29 VITALS
DIASTOLIC BLOOD PRESSURE: 65 MMHG | SYSTOLIC BLOOD PRESSURE: 137 MMHG | TEMPERATURE: 97.5 F | RESPIRATION RATE: 18 BRPM | HEART RATE: 95 BPM

## 2022-06-29 DIAGNOSIS — M81.8 OTHER OSTEOPOROSIS WITHOUT CURRENT PATHOLOGICAL FRACTURE: Primary | ICD-10-CM

## 2022-06-29 DIAGNOSIS — M81.0 AGE-RELATED OSTEOPOROSIS WITHOUT CURRENT PATHOLOGICAL FRACTURE: ICD-10-CM

## 2022-06-29 PROCEDURE — 96374 THER/PROPH/DIAG INJ IV PUSH: CPT | Performed by: NURSE PRACTITIONER

## 2022-06-29 PROCEDURE — 25010000002 ZOLEDRONIC ACID 5 MG/100ML SOLUTION: Performed by: NURSE PRACTITIONER

## 2022-06-29 RX ORDER — ZOLEDRONIC ACID 5 MG/100ML
5 INJECTION, SOLUTION INTRAVENOUS ONCE
Status: COMPLETED | OUTPATIENT
Start: 2022-06-29 | End: 2022-06-29

## 2022-06-29 RX ORDER — SODIUM CHLORIDE 9 MG/ML
250 INJECTION, SOLUTION INTRAVENOUS ONCE
Status: COMPLETED | OUTPATIENT
Start: 2022-06-29 | End: 2022-06-29

## 2022-06-29 RX ORDER — ZOLEDRONIC ACID 5 MG/100ML
5 INJECTION, SOLUTION INTRAVENOUS ONCE
Status: CANCELLED | OUTPATIENT
Start: 2023-06-27

## 2022-06-29 RX ORDER — SODIUM CHLORIDE 9 MG/ML
250 INJECTION, SOLUTION INTRAVENOUS ONCE
OUTPATIENT
Start: 2023-06-27

## 2022-06-29 RX ADMIN — SODIUM CHLORIDE 250 ML: 9 INJECTION, SOLUTION INTRAVENOUS at 13:27

## 2022-06-29 RX ADMIN — ZOLEDRONIC ACID 5 MG: 0.05 INJECTION, SOLUTION INTRAVENOUS at 13:27

## 2022-07-26 ENCOUNTER — OFFICE VISIT (OUTPATIENT)
Dept: OBSTETRICS AND GYNECOLOGY | Facility: CLINIC | Age: 62
End: 2022-07-26

## 2022-07-26 ENCOUNTER — LAB (OUTPATIENT)
Dept: LAB | Facility: OTHER | Age: 62
End: 2022-07-26

## 2022-07-26 VITALS
BODY MASS INDEX: 30.21 KG/M2 | HEIGHT: 70 IN | WEIGHT: 211 LBS | SYSTOLIC BLOOD PRESSURE: 110 MMHG | DIASTOLIC BLOOD PRESSURE: 70 MMHG | HEART RATE: 91 BPM

## 2022-07-26 DIAGNOSIS — Z01.419 ENCOUNTER FOR ROUTINE GYNECOLOGICAL EXAMINATION WITH PAPANICOLAOU SMEAR OF CERVIX: Primary | ICD-10-CM

## 2022-07-26 DIAGNOSIS — Z01.419 ENCOUNTER FOR GYNECOLOGICAL EXAMINATION WITH PAPANICOLAOU SMEAR OF CERVIX: Primary | ICD-10-CM

## 2022-07-26 PROCEDURE — 99396 PREV VISIT EST AGE 40-64: CPT | Performed by: NURSE PRACTITIONER

## 2022-07-26 NOTE — PROGRESS NOTES
Subjective   Chief Complaint   Patient presents with   • Gynecologic Exam     WWSRINIVAS Mcarthur is a 62 y.o. year old  presenting to be seen for her annual exam.  She has no complaints.    She is not sexually active.  In the past 12 months there has not been new sexual partners.      She exercises regularly: no.  She wears her seat belt:yes.  She has concerns about domestic violence: no.  She is taking Vit D and Calcium:yes  Last colonoscopy:   Last DEXA: 2/10/22, osteoporosis, on Reclast infusions annually  Last MM21 screening, diagnostic left 2/10/22, scheduled today; hx of L breast biopsy in  benign findings, sister  at 45yo of breast cancer  Last PAP: 19  Hx of abnormal pap: No, last pap endometrial cells, EMB negative      NATURAL MENOPAUSE  LMP: 12-14 years ago    OB History        2    Para   1    Term   1            AB   1    Living   1       SAB   1    IAB        Ectopic        Molar        Multiple        Live Births                    No Additional Complaints Reported    The following portions of the patient's history were reviewed and updated as appropriate:problem list, current medications, allergies, past family history, past medical history, past social history and past surgical history.    Social History    Tobacco Use      Smoking status: Never Smoker      Smokeless tobacco: Never Used    Review of Systems   Constitutional: Negative.  Negative for chills and fever.   Respiratory: Negative.    Cardiovascular: Negative.  Negative for chest pain and palpitations.   Gastrointestinal: Negative.  Negative for constipation and diarrhea.   Endocrine: Negative.    Genitourinary: Negative.  Negative for vaginal bleeding and vaginal pain.   Musculoskeletal:        Pt has MS   Skin: Negative.    Neurological: Negative for dizziness, syncope, light-headedness and headaches.   Psychiatric/Behavioral: Negative for suicidal ideas. The patient is not  "nervous/anxious.          Objective   /70   Pulse 91   Ht 177.8 cm (70\")   Wt 95.7 kg (211 lb)   LMP  (LMP Unknown) Comment: periods stopped around age 50  Breastfeeding No   BMI 30.28 kg/m²     General:  well developed; well nourished  no acute distress   Skin:  No suspicious lesions seen   Cardiac: Heart sounds are normal.  Regular rate and irregular rhythm without murmur, gallop or rub.   Resp:  Normal expansion.  Clear to auscultation.  No rales, rhonchi, or wheezing.   Thyroid: not examined   Breasts:  Examined in supine position  Symmetric without masses or skin dimpling  Nipples normal without inversion, lesions or discharge  There are no palpable axillary nodes   Abdomen: soft, non-tender; no masses  no umbilical or inginual hernias are present  no hepato-splenomegaly  Normal findings: bowel sounds normal   Psych: alert,oriented, in NAD with a full range of affect, normal behavior and no psychotic features   Pelvis: Clinical staff was present for exam  External genitalia:  normal appearance of the external genitalia including Bartholin's and [place]'s glands.  :  urethral meatus normal;  Vaginal:  normal pink mucosa without prolapse or lesions.  Cervix:  normal appearance.  Uterus:  normal size, shape and consistency.  Adnexa:  normal bimanual exam of the adnexa.  Rectal:  digital rectal exam not performed; anus visually normal appearing.     Lab Review   CBC and CMP    Imaging  CT of abdomen/pelvis report  DEXA  Mammogram report       Diagnoses and all orders for this visit:    1. Encounter for gynecological examination with Papanicolaou smear of cervix (Primary)  -     Cancel: Liquid-based Pap Smear, Screening       Pap results: I will send card in mail or call if abnormal. RTC annually for well woman exams.     Recommended annually MMG. MMG already ordered through PCP, scheduled for today. DEXA is up to date. Continue Infusions for osteoporosis annually.     She is up to date with routine " visit and labs with PCP.     Denies any concerns or questions today.      This note was electronically signed.    Stephanie Lemus, APRN  July 26, 2022

## 2022-07-29 LAB — REF LAB TEST METHOD: NORMAL

## 2023-07-10 ENCOUNTER — TELEPHONE (OUTPATIENT)
Dept: ONCOLOGY | Facility: CLINIC | Age: 63
End: 2023-07-10
Payer: MEDICARE

## 2023-08-01 DIAGNOSIS — M81.0 AGE-RELATED OSTEOPOROSIS WITHOUT CURRENT PATHOLOGICAL FRACTURE: Primary | ICD-10-CM

## 2023-08-01 DIAGNOSIS — M81.8 OTHER OSTEOPOROSIS WITHOUT CURRENT PATHOLOGICAL FRACTURE: ICD-10-CM

## 2023-08-01 RX ORDER — SODIUM CHLORIDE 9 MG/ML
250 INJECTION, SOLUTION INTRAVENOUS ONCE
OUTPATIENT
Start: 2023-08-08

## 2023-08-01 RX ORDER — ZOLEDRONIC ACID 5 MG/100ML
5 INJECTION, SOLUTION INTRAVENOUS ONCE
OUTPATIENT
Start: 2023-08-08

## 2023-08-09 ENCOUNTER — INFUSION (OUTPATIENT)
Dept: ONCOLOGY | Facility: HOSPITAL | Age: 63
End: 2023-08-09
Payer: MEDICARE

## 2023-08-09 VITALS
TEMPERATURE: 97.3 F | HEART RATE: 66 BPM | SYSTOLIC BLOOD PRESSURE: 130 MMHG | DIASTOLIC BLOOD PRESSURE: 55 MMHG | RESPIRATION RATE: 18 BRPM

## 2023-08-09 DIAGNOSIS — M81.0 AGE-RELATED OSTEOPOROSIS WITHOUT CURRENT PATHOLOGICAL FRACTURE: ICD-10-CM

## 2023-08-09 DIAGNOSIS — M81.8 OTHER OSTEOPOROSIS WITHOUT CURRENT PATHOLOGICAL FRACTURE: Primary | ICD-10-CM

## 2023-08-09 LAB
ALBUMIN SERPL-MCNC: 4.1 G/DL (ref 3.5–5.2)
ALBUMIN/GLOB SERPL: 1.4 G/DL
ALP SERPL-CCNC: 75 U/L (ref 39–117)
ALT SERPL W P-5'-P-CCNC: 18 U/L (ref 1–33)
ANION GAP SERPL CALCULATED.3IONS-SCNC: 7 MMOL/L (ref 5–15)
AST SERPL-CCNC: 17 U/L (ref 1–32)
BILIRUB SERPL-MCNC: 0.6 MG/DL (ref 0–1.2)
BUN SERPL-MCNC: 10 MG/DL (ref 8–23)
BUN/CREAT SERPL: 15.4 (ref 7–25)
CALCIUM SPEC-SCNC: 8.9 MG/DL (ref 8.6–10.5)
CHLORIDE SERPL-SCNC: 106 MMOL/L (ref 98–107)
CO2 SERPL-SCNC: 28 MMOL/L (ref 22–29)
CREAT SERPL-MCNC: 0.65 MG/DL (ref 0.57–1)
EGFRCR SERPLBLD CKD-EPI 2021: 97.8 ML/MIN/1.73
GLOBULIN UR ELPH-MCNC: 3 GM/DL
GLUCOSE SERPL-MCNC: 107 MG/DL (ref 65–99)
HOLD SPECIMEN: NORMAL
MAGNESIUM SERPL-MCNC: 2.1 MG/DL (ref 1.6–2.4)
PHOSPHATE SERPL-MCNC: 2.8 MG/DL (ref 2.5–4.5)
POTASSIUM SERPL-SCNC: 3.8 MMOL/L (ref 3.5–5.2)
PROT SERPL-MCNC: 7.1 G/DL (ref 6–8.5)
SODIUM SERPL-SCNC: 141 MMOL/L (ref 136–145)

## 2023-08-09 PROCEDURE — 25010000002 ZOLEDRONIC ACID 5 MG/100ML SOLUTION: Performed by: NURSE PRACTITIONER

## 2023-08-09 RX ORDER — SODIUM CHLORIDE 9 MG/ML
250 INJECTION, SOLUTION INTRAVENOUS ONCE
OUTPATIENT
Start: 2024-08-08

## 2023-08-09 RX ORDER — SODIUM CHLORIDE 9 MG/ML
250 INJECTION, SOLUTION INTRAVENOUS ONCE
Status: COMPLETED | OUTPATIENT
Start: 2023-08-09 | End: 2023-08-09

## 2023-08-09 RX ORDER — ZOLEDRONIC ACID 5 MG/100ML
5 INJECTION, SOLUTION INTRAVENOUS ONCE
Status: CANCELLED | OUTPATIENT
Start: 2024-08-08

## 2023-08-09 RX ORDER — ZOLEDRONIC ACID 5 MG/100ML
5 INJECTION, SOLUTION INTRAVENOUS ONCE
Status: COMPLETED | OUTPATIENT
Start: 2023-08-09 | End: 2023-08-09

## 2023-08-09 RX ADMIN — SODIUM CHLORIDE 250 ML: 9 INJECTION, SOLUTION INTRAVENOUS at 10:26

## 2023-08-09 RX ADMIN — ZOLEDRONIC ACID 5 MG: 0.05 INJECTION, SOLUTION INTRAVENOUS at 10:26

## 2023-09-27 ENCOUNTER — OFFICE VISIT (OUTPATIENT)
Dept: OBSTETRICS AND GYNECOLOGY | Facility: CLINIC | Age: 63
End: 2023-09-27
Payer: MEDICARE

## 2023-09-27 ENCOUNTER — LAB (OUTPATIENT)
Dept: LAB | Facility: OTHER | Age: 63
End: 2023-09-27
Payer: MEDICARE

## 2023-09-27 VITALS
HEIGHT: 70 IN | WEIGHT: 205.8 LBS | DIASTOLIC BLOOD PRESSURE: 60 MMHG | SYSTOLIC BLOOD PRESSURE: 120 MMHG | BODY MASS INDEX: 29.46 KG/M2 | HEART RATE: 42 BPM

## 2023-09-27 DIAGNOSIS — Z01.419 ENCOUNTER FOR GYNECOLOGICAL EXAMINATION WITH PAPANICOLAOU SMEAR OF CERVIX: Primary | ICD-10-CM

## 2023-09-27 DIAGNOSIS — Z12.31 SCREENING MAMMOGRAM, ENCOUNTER FOR: ICD-10-CM

## 2023-09-27 PROCEDURE — 3015F CERV CANCER SCREEN DOCD: CPT | Performed by: NURSE PRACTITIONER

## 2023-09-27 PROCEDURE — 1160F RVW MEDS BY RX/DR IN RCRD: CPT | Performed by: NURSE PRACTITIONER

## 2023-09-27 PROCEDURE — 1159F MED LIST DOCD IN RCRD: CPT | Performed by: NURSE PRACTITIONER

## 2023-09-27 PROCEDURE — 99397 PER PM REEVAL EST PAT 65+ YR: CPT | Performed by: NURSE PRACTITIONER

## 2023-09-27 NOTE — PROGRESS NOTES
"Subjective   Chief Complaint   Patient presents with    Gynecologic Exam     WWE     Maria C Mcarthur is a 63 y.o. year old  presenting to be seen for her annual exam.  She has no complaints.    She is not sexually active.  In the past 12 months there has not been new sexual partners.      She exercises regularly: no.  She wears her seat belt:yes.  She has concerns about domestic violence: no.  She is taking Vit D and Calcium:yes  Last colonoscopy: per pt   Last DEXA: 2/10/22, osteoporosis, on Reclast infusions annually  Last MM22, scheduled today; hx of L breast biopsy in  benign findings, sister  at 47yo of breast cancer  Last PAP: 22  Hx of abnormal pap: No,  endometrial cells, EMB negative      NATURAL MENOPAUSE  LMP: age 50    OB History          2    Para   1    Term   1            AB   1    Living   1         SAB   1    IAB        Ectopic        Molar        Multiple        Live Births                    No Additional Complaints Reported    The following portions of the patient's history were reviewed and updated as appropriate:problem list, current medications, allergies, past family history, past medical history, past social history and past surgical history.    Social History    Tobacco Use      Smoking status: Never      Smokeless tobacco: Never    Review of Systems   Constitutional: Negative.  Negative for chills and fever.   Respiratory: Negative.     Cardiovascular: Negative.  Negative for chest pain and palpitations.        \"Leaky heart valve\" being worked up by cardiology, they are aware of low HR.    Gastrointestinal: Negative.  Negative for constipation and diarrhea.   Endocrine: Negative.    Genitourinary: Negative.  Negative for vaginal bleeding and vaginal pain.   Musculoskeletal:         Pt has MS   Skin: Negative.    Neurological:  Negative for dizziness, syncope, light-headedness and headaches.   Psychiatric/Behavioral:  Negative for suicidal " "ideas. The patient is not nervous/anxious.        Objective   /60   Pulse (!) 42   Ht 177.8 cm (70\")   Wt 93.4 kg (205 lb 12.8 oz)   LMP  (LMP Unknown) Comment: periods stopped around age 50  BMI 29.53 kg/m²     General:  well developed; well nourished  no acute distress   Skin:  No suspicious lesions seen   Cardiac: Heart sounds are normal.  Bradycardia and regular rhythm without murmur, gallop or rub.   Resp:  Normal expansion.  Clear to auscultation.  No rales, rhonchi, or wheezing.   Thyroid: not examined   Breasts:  Examined in supine position  Symmetric without masses or skin dimpling  Nipples normal without inversion, lesions or discharge  There are no palpable axillary nodes   Abdomen: soft, non-tender; no masses  no umbilical or inginual hernias are present  no hepato-splenomegaly  Normal findings: bowel sounds normal   Psych: alert,oriented, in NAD with a full range of affect, normal behavior and no psychotic features   Pelvis: Clinical staff was present for exam  External genitalia:  normal appearance of the external genitalia including Bartholin's and [place]'s glands.  :  urethral meatus normal;  Vaginal:  normal pink mucosa without prolapse or lesions.  Cervix:  normal appearance.  Uterus:  normal size, shape and consistency.  Adnexa:  non palpable bilaterally.  Rectal:  digital rectal exam not performed; anus visually normal appearing.     Lab Review   No data reviewed    Imaging  CT of abdomen/pelvis report  DEXA  Mammogram report       Diagnoses and all orders for this visit:    1. Encounter for gynecological examination with Papanicolaou smear of cervix (Primary)  -     LIQUID-BASED PAP SMEAR WITH HPV GENOTYPING IF ASCUS ([facility])    2. Screening mammogram, encounter for  -     Mammo Screening Digital Tomosynthesis Bilateral With CAD; Future       Pap results: I will send card in mail or call if abnormal. RTC q 2 yrs for well woman exams per Medicare guidelines.     Recommended " annually MMG. MMG scheduled for today. DEXA is up to date. Continue Infusions for osteoporosis annually.     She is up to date with routine visit and labs with PCP.     Denies any concerns or questions today.      This note was electronically signed.    Stephanie Lemus, APRN  September 27, 2023

## 2023-09-29 LAB — REF LAB TEST METHOD: NORMAL
